# Patient Record
(demographics unavailable — no encounter records)

---

## 2019-12-10 NOTE — OPERATIVE REPORT
Nonrecallable Operative Report


DATE OF SURGERY: 12/06/19


PREOPERATIVE DIAGNOSIS: Bleeding and prolapsing internal hemorrhoids, grade 2.


POSTOPERATIVE DIAGNOSIS: Same as above


OPERATION: Rubber band ligation of internal hemorrhoids x8


SURGEON: REGINE JONES


ANESTHESIA: LMAC


TISSUE REMOVED OR ALTERED: None


COMPLICATIONS: 





None apparent


ESTIMATED BLOOD LOSS: Minimal


PROCEDURE: 





Indication for the procedure: This is a 69-year-old male with prolapsing 

internal hemorrhoids.  He is moderately symptomatic, and does not desire 

surgical hemorrhoidectomy (due to the pain associated with the operation).  He 

has opted for rubber band ligation of his internal hemorrhoids.





Procedure in detail: After informed consent was obtained, the patient was 

brought into the operating room and laid in the left lateral decubitus position.

 A Hill-Woodward retractor was placed into the rectum.  The patient had enlarged

hemorrhoids circumferentially.  Multiple rubber bands were used to ligate these 

internal hemorrhoids.  8 rubber bands were used in total.  After this was 

completed, the Hill-Woodward retractor was removed, and the procedure was 

concluded.  All sponge, instrument, and needle counts were correct x2.





Condition: Stable.

## 2019-12-10 NOTE — DISCHARGE SUMMARY
Discharge Summary (SDC)





- Discharge


Final Diagnosis: 





Bleeding and prolapsing internal hemorrhoids, grade 2


Date of Surgery: 12/06/19


Discharge Date: 12/06/19


Condition: Stable


Forms:  [facility] Anesthesia D/C Instruction, Discharge POC-Surgical Service


Treatment or Instructions: 


DIET AS TOLERATED. 


NON STRENUOUS ACTIVITY. 


DO NOT STRAIN. 


DO NOT MAKE ANY IMPORTANT DECISION. 


NO DRIVING. 


NO ALCOHOL. 


Referrals: 


REGINE JONES MD [ACTIVE STAFF] - 12/17/19 1:15 pm


ARIANNE TAPIA MD [Primary Care Provider] - 


Discharge Diet: As Tolerated


Respiratory Treatments at Home: Deep Breathing/Coughing, Incentive Spirometer


Discharge Activity: Activity As Tolerated, Balance Activity w/Rest


Home Care Assistance: None Needed


Report the Following to Your Physician Immediately: Shortness of Breath, Nausea,

Vomiting, Increase in Pain, Fever over 101 Degrees, Unusual Bleeding, Redness, 

Swelling, Warmth

## 2020-07-21 NOTE — OPERATIVE REPORT
Operative Report-[facility]


Operative Report: 





DATE OF SURGERY: 7/21/2020


PREOPERATIVE DIAGNOSIS: CATARACT, LEFT EYE.


POSTOPERATIVE DIAGNOSIS: CATARACT, LEFT EYE.


PROCEDURE PERFORMED: PHACOEMULSIFICATION WITH POSTERIOR CHAMBER INTRAOCULAR 

LENS, LEFT EYE.


Intraocular Lens Model : MX60E 21.5


Total Phaco Time: 8.44 CDE


SURGEON: EUSEBIA GOTTLIEB MD


ANESTHESIA: TOPICAL WITH MAC.


INDICATIONS FOR SURGERY: Difficultly driving at night


PROCEDURE:


The patient was brought to the Operating Room and placed on the operative table.

Following tetracaine drops, topical anesthesia was administered. This consisted 

of instrument wipe pledgets soaked in a solution of 4% Xylocaine mixed with 

0.75% Marcaine in a 1:2 ratio. A 2 x 1 cm pledget was placed in the superior 

fornix. A 1 x 1 cm pledget was placed in the inferior fornix. The eye was 

patched shut for 5 minutes. The patch was removed. The eye was sterilely prepped

and draped in the usual manner. Lid speculum was placed in the eye. The pledgets

were removed. 4-0 black silk sutures were placed around the superior and the 

inferior rectus muscles to be used as traction. A conjunctival peritomy was made

at the 10 o'clock position. Hemostasis was obtained with bipolar cautery. A 

posterior limbal groove was created using a crescent knife and dissected 

anteriorly towards the cornea. A sharp point blade was used to create a 

paracentesis site at the 2 o'clock position. 0.2 cc non preserved Lidocaine was 

injected into the anterior chamber. A 2.4 mm keratome was used to enter the 

anterior chamber through the groove. Viscoelastic was injected into the anterior

chamber. An anterior capsulotomy was performed using Utrata forceps in a 

capsulorrhexis fashion. Hydrodissection and hydrodelineation were performed. 

Phacoemulsification was performed in divide-and-conquer technique.


Following this, the I/A unit was used to remove residual cortex. Viscoelastic 

was injected into the capsular bag. The Intraocular lens was placed in the 

capsular bag. The I/A unit was used to remove residual viscoelastic. The wound 

was seen to be watertight under high and low pressure, and no sutures were 

placed. The intraocular lens was well centered. The pressure was adjusted in the

eye to normal pressure. The 4-0 black silk sutures and lid speculum were 

removed. The eye was shielded after Besivance,prednisolone, and Cosopt drops 

were placed. The patient tolerated the procedure well and was sent to the 

Recovery Room in good condition.